# Patient Record
Sex: FEMALE | Race: WHITE | NOT HISPANIC OR LATINO | URBAN - METROPOLITAN AREA
[De-identification: names, ages, dates, MRNs, and addresses within clinical notes are randomized per-mention and may not be internally consistent; named-entity substitution may affect disease eponyms.]

---

## 2022-02-10 ENCOUNTER — OFFICE (OUTPATIENT)
Dept: URBAN - METROPOLITAN AREA CLINIC 9 | Facility: CLINIC | Age: 52
End: 2022-02-10

## 2022-02-10 VITALS
OXYGEN SATURATION: 100 % | SYSTOLIC BLOOD PRESSURE: 145 MMHG | DIASTOLIC BLOOD PRESSURE: 71 MMHG | HEART RATE: 55 BPM | WEIGHT: 134 LBS | HEIGHT: 61 IN

## 2022-02-10 DIAGNOSIS — K21.9 GASTRO-ESOPHAGEAL REFLUX DISEASE WITHOUT ESOPHAGITIS: ICD-10-CM

## 2022-02-10 DIAGNOSIS — K62.89 OTHER SPECIFIED DISEASES OF ANUS AND RECTUM: ICD-10-CM

## 2022-02-10 DIAGNOSIS — K64.4 RESIDUAL HEMORRHOIDAL SKIN TAGS: ICD-10-CM

## 2022-02-10 PROCEDURE — 99203 OFFICE O/P NEW LOW 30 MIN: CPT | Performed by: NURSE PRACTITIONER

## 2022-02-10 RX ORDER — POLYETHYLENE GLYCOL 3350, SODIUM SULFATE, SODIUM CHLORIDE, POTASSIUM CHLORIDE, ASCORBIC ACID, SODIUM ASCORBATE 140-9-5.2G
KIT ORAL
Qty: 1 | Refills: 0 | Status: COMPLETED
Start: 2022-02-10 | End: 2022-02-12

## 2022-02-10 NOTE — SERVICEHPINOTES
Ms. Barnett  is a pleasant 51-year-old  female with a PMH significant for obesity status post gastric bypass April 2021, GERD, vitamin B12 deficiency due to gastric bypass, gallstones status post laparoscopic cholecystectomy 3 years ago.  The patient presents due to acute onset of anorectal pain 2 weeks ago.  She states she had a pressure 2 weeks ago and then on Sunday started having bad anorectal pain.  Pain worse when she has a bowel movement, but other exacerbating factors include sitting for long periods of time.  She typically has 1-2 soft, formed, complete bowel movements every single day.  She denies any hard stools, straining, constipation.  No melena or hematochezia.  She states that she was seen at [facility] yesterday and was prescribed lidocaine 2% topical as well as hydrocortisone cream.  She states these have not given her much relief yet.  No rectal bleeding whatsoever.  No known primary family history of colon cancer, stomach cancer, other GI issues.  Does have a family history with paternal grandparents with stomach cancer.  Father reportedly with polyps.  Unsure if these were benign or pre cancers.
br
br   Patient has had colonoscopy in the past by Dr. Love. 07/08/2009 for rectal bleeding and change in bowel patterns. Small internal hemorrhoids noted otherwise unremarkable. No polyps. 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 No lymphocytic, collagenous, specific colitis noted.

## 2022-02-10 NOTE — SERVICENOTES
? anorectal fissure that couldn't be seen on todays exam due to pain and positioning. She can continue Prep H cream as already prescribed and take stools softeners 1-2 per day to help keep stools soft. Epson salt baths and Tylenol. Overdue for screening colonoscopy.

## 2022-02-12 ENCOUNTER — OFFICE (OUTPATIENT)
Dept: URBAN - METROPOLITAN AREA PATHOLOGY 22 | Facility: PATHOLOGY | Age: 52
End: 2022-02-12
Payer: COMMERCIAL

## 2022-02-12 ENCOUNTER — AMBULATORY SURGICAL CENTER (OUTPATIENT)
Dept: URBAN - METROPOLITAN AREA SURGERY 2 | Facility: SURGERY | Age: 52
End: 2022-02-12
Payer: COMMERCIAL

## 2022-02-12 VITALS
SYSTOLIC BLOOD PRESSURE: 162 MMHG | HEART RATE: 45 BPM | WEIGHT: 132 LBS | WEIGHT: 132 LBS | OXYGEN SATURATION: 100 % | DIASTOLIC BLOOD PRESSURE: 62 MMHG | OXYGEN SATURATION: 99 % | HEART RATE: 45 BPM | OXYGEN SATURATION: 98 % | DIASTOLIC BLOOD PRESSURE: 82 MMHG | HEART RATE: 64 BPM | HEART RATE: 58 BPM | SYSTOLIC BLOOD PRESSURE: 134 MMHG | HEART RATE: 64 BPM | TEMPERATURE: 97.2 F | DIASTOLIC BLOOD PRESSURE: 66 MMHG | RESPIRATION RATE: 10 BRPM | HEART RATE: 58 BPM | HEART RATE: 70 BPM | SYSTOLIC BLOOD PRESSURE: 156 MMHG | DIASTOLIC BLOOD PRESSURE: 86 MMHG | TEMPERATURE: 98.2 F | HEART RATE: 56 BPM | DIASTOLIC BLOOD PRESSURE: 85 MMHG | HEART RATE: 72 BPM | TEMPERATURE: 98.2 F | HEART RATE: 72 BPM | RESPIRATION RATE: 18 BRPM | HEART RATE: 58 BPM | DIASTOLIC BLOOD PRESSURE: 86 MMHG | HEART RATE: 68 BPM | HEIGHT: 61 IN | DIASTOLIC BLOOD PRESSURE: 62 MMHG | HEART RATE: 70 BPM | DIASTOLIC BLOOD PRESSURE: 78 MMHG | SYSTOLIC BLOOD PRESSURE: 134 MMHG | HEART RATE: 45 BPM | HEIGHT: 61 IN | SYSTOLIC BLOOD PRESSURE: 162 MMHG | RESPIRATION RATE: 10 BRPM | WEIGHT: 132 LBS | RESPIRATION RATE: 10 BRPM | TEMPERATURE: 98.2 F | SYSTOLIC BLOOD PRESSURE: 148 MMHG | DIASTOLIC BLOOD PRESSURE: 85 MMHG | DIASTOLIC BLOOD PRESSURE: 82 MMHG | SYSTOLIC BLOOD PRESSURE: 142 MMHG | HEART RATE: 68 BPM | DIASTOLIC BLOOD PRESSURE: 85 MMHG | HEART RATE: 56 BPM | OXYGEN SATURATION: 100 % | OXYGEN SATURATION: 96 % | DIASTOLIC BLOOD PRESSURE: 78 MMHG | HEART RATE: 70 BPM | DIASTOLIC BLOOD PRESSURE: 66 MMHG | DIASTOLIC BLOOD PRESSURE: 62 MMHG | SYSTOLIC BLOOD PRESSURE: 148 MMHG | HEART RATE: 72 BPM | SYSTOLIC BLOOD PRESSURE: 137 MMHG | SYSTOLIC BLOOD PRESSURE: 156 MMHG | OXYGEN SATURATION: 100 % | RESPIRATION RATE: 18 BRPM | OXYGEN SATURATION: 99 % | RESPIRATION RATE: 18 BRPM | OXYGEN SATURATION: 96 % | SYSTOLIC BLOOD PRESSURE: 149 MMHG | SYSTOLIC BLOOD PRESSURE: 148 MMHG | TEMPERATURE: 97.2 F | HEART RATE: 68 BPM | SYSTOLIC BLOOD PRESSURE: 162 MMHG | SYSTOLIC BLOOD PRESSURE: 142 MMHG | HEART RATE: 64 BPM | SYSTOLIC BLOOD PRESSURE: 149 MMHG | SYSTOLIC BLOOD PRESSURE: 137 MMHG | SYSTOLIC BLOOD PRESSURE: 134 MMHG | OXYGEN SATURATION: 96 % | DIASTOLIC BLOOD PRESSURE: 86 MMHG | DIASTOLIC BLOOD PRESSURE: 82 MMHG | DIASTOLIC BLOOD PRESSURE: 66 MMHG | OXYGEN SATURATION: 98 % | OXYGEN SATURATION: 99 % | TEMPERATURE: 97.2 F | OXYGEN SATURATION: 98 % | HEART RATE: 56 BPM | SYSTOLIC BLOOD PRESSURE: 137 MMHG | DIASTOLIC BLOOD PRESSURE: 78 MMHG | SYSTOLIC BLOOD PRESSURE: 142 MMHG | SYSTOLIC BLOOD PRESSURE: 156 MMHG | SYSTOLIC BLOOD PRESSURE: 149 MMHG | HEIGHT: 61 IN

## 2022-02-12 DIAGNOSIS — D12.4 BENIGN NEOPLASM OF DESCENDING COLON: ICD-10-CM

## 2022-02-12 DIAGNOSIS — Z12.11 ENCOUNTER FOR SCREENING FOR MALIGNANT NEOPLASM OF COLON: ICD-10-CM

## 2022-02-12 DIAGNOSIS — Z83.71 FAMILY HISTORY OF COLONIC POLYPS: ICD-10-CM

## 2022-02-12 PROBLEM — K63.89 OTHER SPECIFIED DISEASES OF INTESTINE: Status: ACTIVE | Noted: 2022-02-12

## 2022-02-12 PROBLEM — K63.5 POLYP OF COLON: Status: ACTIVE | Noted: 2022-02-12

## 2022-02-12 PROCEDURE — 45380 COLONOSCOPY AND BIOPSY: CPT | Mod: 33 | Performed by: SPECIALIST

## 2022-02-12 PROCEDURE — 88305 TISSUE EXAM BY PATHOLOGIST: CPT | Performed by: STUDENT IN AN ORGANIZED HEALTH CARE EDUCATION/TRAINING PROGRAM

## 2022-03-17 ENCOUNTER — OFFICE (OUTPATIENT)
Dept: URBAN - METROPOLITAN AREA CLINIC 9 | Facility: CLINIC | Age: 52
End: 2022-03-17

## 2022-03-17 VITALS
SYSTOLIC BLOOD PRESSURE: 159 MMHG | HEIGHT: 61 IN | DIASTOLIC BLOOD PRESSURE: 82 MMHG | OXYGEN SATURATION: 97 % | HEART RATE: 63 BPM | WEIGHT: 129 LBS

## 2022-03-17 DIAGNOSIS — K64.5 PERIANAL VENOUS THROMBOSIS: ICD-10-CM

## 2022-03-17 PROCEDURE — 99213 OFFICE O/P EST LOW 20 MIN: CPT | Performed by: NURSE PRACTITIONER

## 2022-03-17 NOTE — SERVICENOTES
Intolerant to fiber supplementation.  has tried recently and this made stools harder.  She states due to her history of gastric bypass she is not able to get optimal water intake.  She is working on this currently.  We discussed starting MiraLax 17 g p.o. daily after resolution of her thrombosed hemorrhoid.  Will refer to Dr. Elizabeth possible incision and drainage of thrombosed hemorrhoid noted on exam.

## 2022-03-17 NOTE — SERVICEHPINOTES
Ms. Barnett is a pleasant 51-year-old  female with a PMH significant for obesity status post gastric bypass April 2021, GERD, vitamin B12 deficiency due to gastric bypass, gallstones status post laparoscopic cholecystectomy 3 years ago. 
br
  Patient presents due to acute anorectal pain intermittently for several weeks.   she was most recently seen 02/10/2022 for anorectal pain and had an inflamed external hemorrhoid.  No thrombosed hemorrhoids or anorectal fissures noted.  She was started on fiber supplementation, stool softener, and set up for colonoscopy by Dr. Tapia.  Colonoscopy was performed 02/12/2022 for screening and constipation. Noted mild melanosis in the whole colon. Polyps removed in the left colon. External hemorrhoids. No mention of internal hemorrhoids. Pathology noting 1 sessile serrated adenoma and 2 hyperplastic polyps. She is due for recall surveillance colonoscopy in 7 years.br
br OV HPI 2/10/22:brPatient after colonoscopy has continued to have intermittent anorectal pain. She is completely denying any straining or lingering on the toilet. She states yesterday she had a significant episode of anorectal pain and possible protrusion of internal hemorrhoid. She states she also noted some blood in the toilet. No further bleeding. No lightheadedness, dizziness. No melena or hematochezia. She states she has 1-2 soft, small, formed, brown stools per day. She states she tried to take fiber supplementation, but this makes stools only harder as due to her gastric bypass she is not able to intake adequate water. She states she is trying to drink more water. She is taking stool softeners 1 by mouth per day. She was told to increase this to 2 stool softeners twice a day if need be for the time being. Warm Sitz baths. Tylenol as needed. Will do referral to colorectal surgeon Dr. Kasia Avery to see if this needs to have I&ampD. br  The patient presents due to acute onset of anorectal pain 2 weeks ago. She states she had a pressure 2 weeks ago and then on Sunday started having bad anorectal pain. Pain worse when she has a bowel movement, but other exacerbating factors include sitting for long periods of time. She typically has 1-2 soft, formed, complete bowel movements every single day. She denies any hard stools, straining, constipation. No melena or hematochezia. She states that she was seen at [facility] yesterday and was prescribed lidocaine 2% topical as well as hydrocortisone cream. She states these have not given her much relief yet. No rectal bleeding whatsoever. No known primary family history of colon cancer, stomach cancer, other GI issues. Does have a family history with paternal grandparents with stomach cancer. Father reportedly with polyps. Unsure if these were benign or pre cancers. Patient has had colonoscopy in the past by Dr. Love. 07/08/2009 for rectal bleeding and change in bowel patterns. Small internal hemorrhoids noted otherwise unremarkable. No polyps. 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 No lymphocytic, collagenous, specific colitis noted

## 2022-03-18 LAB
CBC, PLATELET, NO DIFFERENTIAL: HEMATOCRIT: 39 % (ref 34–46.6)
CBC, PLATELET, NO DIFFERENTIAL: HEMOGLOBIN: 12.3 G/DL (ref 11.1–15.9)
CBC, PLATELET, NO DIFFERENTIAL: MCH: 29.4 PG (ref 26.6–33)
CBC, PLATELET, NO DIFFERENTIAL: MCHC: 31.5 G/DL (ref 31.5–35.7)
CBC, PLATELET, NO DIFFERENTIAL: MCV: 93 FL (ref 79–97)
CBC, PLATELET, NO DIFFERENTIAL: NRBC: (no result)
CBC, PLATELET, NO DIFFERENTIAL: PLATELETS: 233 X10E3/UL (ref 150–450)
CBC, PLATELET, NO DIFFERENTIAL: RBC: 4.19 X10E6/UL (ref 3.77–5.28)
CBC, PLATELET, NO DIFFERENTIAL: RDW: 13.5 % (ref 11.7–15.4)
CBC, PLATELET, NO DIFFERENTIAL: WBC: 7.6 X10E3/UL (ref 3.4–10.8)